# Patient Record
Sex: MALE
[De-identification: names, ages, dates, MRNs, and addresses within clinical notes are randomized per-mention and may not be internally consistent; named-entity substitution may affect disease eponyms.]

---

## 2018-10-22 ENCOUNTER — HOSPITAL ENCOUNTER (EMERGENCY)
Dept: HOSPITAL 25 - UCEAST | Age: 23
Discharge: HOME | End: 2018-10-22
Payer: COMMERCIAL

## 2018-10-22 DIAGNOSIS — N50.89: Primary | ICD-10-CM

## 2018-10-22 PROCEDURE — 86694 HERPES SIMPLEX NES ANTBDY: CPT

## 2018-10-22 PROCEDURE — 87529 HSV DNA AMP PROBE: CPT

## 2018-10-22 PROCEDURE — 86695 HERPES SIMPLEX TYPE 1 TEST: CPT

## 2018-10-22 PROCEDURE — G0463 HOSPITAL OUTPT CLINIC VISIT: HCPCS

## 2018-10-22 PROCEDURE — 36415 COLL VENOUS BLD VENIPUNCTURE: CPT

## 2018-10-22 PROCEDURE — 86703 HIV-1/HIV-2 1 RESULT ANTBDY: CPT

## 2018-10-22 PROCEDURE — 99201: CPT

## 2018-10-22 PROCEDURE — 86592 SYPHILIS TEST NON-TREP QUAL: CPT

## 2018-10-22 PROCEDURE — 80074 ACUTE HEPATITIS PANEL: CPT

## 2018-10-22 PROCEDURE — 86696 HERPES SIMPLEX TYPE 2 TEST: CPT

## 2018-10-22 NOTE — UC
Skin Complaint HPI





- HPI Summary


HPI Summary: 





22 yo male presents with penile lesion. He tells me that over the last 2-3 days 

he has noticed some small red areas on the head of his penis. They are not 

painful or draining. His last sexual activity was 3 weeks ago with his 

girlfriend of 2.5 years. He says that he has been monogamous and believes that 

his partner has as well. He has a hx of HSV1 and is concerned he may have 

genital herpes. Denies fever, chills, dysuria, hematuria, penile discharge, or 

testicular pain.





- History of Current Complaint


Chief Complaint: UCGeneralIllness


Time Seen by Provider: 10/22/18 14:40


Stated Complaint: PERSONAL


Hx Obtained From: Patient


Current Severity: None


Pain Intensity: 0





- Allergy/Home Medications


Allergies/Adverse Reactions: 


 Allergies











Allergy/AdvReac Type Severity Reaction Status Date / Time


 


No Known Allergies Allergy   Verified 10/22/18 14:03











Home Medications: 


 Home Medications





NK [No Home Medications Reported]  10/22/18 [History Confirmed 10/22/18]











Review of Systems


Constitutional: Negative


Skin: Other - Penile lesion


Respiratory: Negative


Cardiovascular: Negative


Genitourinary: Negative


Neurovascular: Negative


Musculoskeletal: Negative


Neurological: Negative


Psychological: Negative


All Other Systems Reviewed And Are Negative: Yes





PMH/Surg Hx/FS Hx/Imm Hx





- Additional Past Medical History


Additional PMH: 





None





- Surgical History


Surgical History: None


Surgery Procedure, Year, and Place: spotaneous pneumothorax





- Family History


Known Family History: Positive: None





- Social History


Occupation: Employed Full-time


Lives: With Family


Alcohol Use: Weekly


Substance Use Type: None


Smoking Status (MU): Never Smoked Tobacco





- Immunization History


Most Recent Tetanus Shot: UTD





Physical Exam





- Summary


Physical Exam Summary: 





GENERAL: NAD. WDWN. No pain distress.


SKIN: No rashes, sores, lesions, or open wounds.


NECK: Supple. Nontender. No lymphadenopathy. 


CHEST:  CTAB. No r/r/w. No accessory muscle use. Breathing comfortably and in 

no distress.


CV:  RRR. Without m/r/g. Pulses intact. Cap refill <2seconds


NEURO: Alert. 


PSYCH: Age appropriate behavior.


Triage Information Reviewed: Yes


Vital Signs: 


 Initial Vital Signs











Temp  98.4 F   10/22/18 13:59


 


Pulse  90   10/22/18 13:59


 


Resp  20   10/22/18 13:59


 


BP  167/96   10/22/18 13:59


 


Pulse Ox  99   10/22/18 13:59











Vital Signs Reviewed: Yes


Male Genital Exam: Positive: No Hernia, Lesions - On glans penis there are four 

2mm flat erythematous lesions without ulceration, blister, drainage, or 

tenderness..  Negative: Epididymal Tenderness, Hernia Mass, Inguinal Tenderness

, Scrotum Tenderness (R), Scrotum Tenderness (L), Testicular Tenderness (R), 

Testicular Tenderness (L), Urethral Discharge





Course/Dx





- Course


Course Of Treatment: A culture was obtained, but I am unsure how beneficial 

this will be as there is no discharge or open wound. His lesions do not appear 

typical for HSV and appear to be more consistent with skin irritation. He would 

like STD testing via blood - therefore will draw for HIV, hepatitis, RPR, and 

HSV and call him with results.





- Diagnoses


Provider Diagnoses: Lesions on penis





Discharge





- Sign-Out/Discharge


Documenting (check all that apply): Patient Departure


All imaging exams completed and their final reports reviewed: No Studies





- Discharge Plan


Condition: Stable


Disposition: HOME


Patient Education Materials:  Genital Herpes Simplex (ED)


Referrals: 


No Primary Care Phys,NOPCP [Primary Care Provider] - 


Additional Instructions: 


If you develop a fever, shortness of breath, chest pain, new or worsening 

symptoms - please call your PCP or go to the ED.


 





Your blood pressure was high at todays visit. Please see your primary provider 

within 4 weeks for recheck and re-evaluation.








- Billing Disposition and Condition


Condition: STABLE


Disposition: Home